# Patient Record
Sex: FEMALE | Race: WHITE
[De-identification: names, ages, dates, MRNs, and addresses within clinical notes are randomized per-mention and may not be internally consistent; named-entity substitution may affect disease eponyms.]

---

## 2019-03-24 ENCOUNTER — HOSPITAL ENCOUNTER (EMERGENCY)
Dept: HOSPITAL 56 - MW.ED | Age: 24
Discharge: HOME | End: 2019-03-24
Payer: COMMERCIAL

## 2019-03-24 VITALS — DIASTOLIC BLOOD PRESSURE: 62 MMHG | SYSTOLIC BLOOD PRESSURE: 109 MMHG

## 2019-03-24 DIAGNOSIS — Z91.018: ICD-10-CM

## 2019-03-24 DIAGNOSIS — O21.9: Primary | ICD-10-CM

## 2019-03-24 DIAGNOSIS — Z88.8: ICD-10-CM

## 2019-03-24 DIAGNOSIS — Z3A.17: ICD-10-CM

## 2019-03-24 DIAGNOSIS — F32.9: ICD-10-CM

## 2019-03-24 DIAGNOSIS — Z79.899: ICD-10-CM

## 2019-03-24 DIAGNOSIS — O99.342: ICD-10-CM

## 2019-03-24 DIAGNOSIS — F41.9: ICD-10-CM

## 2019-03-24 DIAGNOSIS — Z88.2: ICD-10-CM

## 2019-03-24 LAB
CHLORIDE SERPL-SCNC: 104 MMOL/L (ref 98–107)
SODIUM SERPL-SCNC: 139 MMOL/L (ref 136–145)

## 2019-03-24 PROCEDURE — 99284 EMERGENCY DEPT VISIT MOD MDM: CPT

## 2019-03-24 PROCEDURE — 96374 THER/PROPH/DIAG INJ IV PUSH: CPT

## 2019-03-24 PROCEDURE — 85025 COMPLETE CBC W/AUTO DIFF WBC: CPT

## 2019-03-24 PROCEDURE — 81003 URINALYSIS AUTO W/O SCOPE: CPT

## 2019-03-24 PROCEDURE — 36415 COLL VENOUS BLD VENIPUNCTURE: CPT

## 2019-03-24 PROCEDURE — 80053 COMPREHEN METABOLIC PANEL: CPT

## 2019-03-24 PROCEDURE — 96361 HYDRATE IV INFUSION ADD-ON: CPT

## 2019-03-24 PROCEDURE — 76815 OB US LIMITED FETUS(S): CPT

## 2019-03-24 NOTE — EDM.PDOC
ED HPI GENERAL MEDICAL PROBLEM





- General


Chief Complaint: Gastrointestinal Problem


Stated Complaint: PREG. BELLY PAIN


Time Seen by Provider: 19 10:47


Source of Information: Reports: Patient


History Limitations: Reports: No Limitations





- History of Present Illness


INITIAL COMMENTS - FREE TEXT/NARRATIVE: 





HISTORY AND PHYSICAL:





History of present illness:


Patient is a 23-year-old female here with complaint of nausea and vomiting. She 

is 17 weeks gestation, . She states she woke up this morning dry heaving 

then started vomiting bile. She has had nausea and vomiting throughout her 

whole pregnancy. She took some zofran but states she threw this up. She tried 

eating but was unable to keep anything down. She is complaining of a headache 

and states she is having left sided abdominal pain. She states it feels like a 

pulled muscle as she has had a pulled muscle with her previous pregnancy and it 

feels similar. She denies fevers, chills, diarrhea, cough, congestion, vaginal 

bleeding or discharge. 





Review of systems: 


As per history of present illness and below otherwise all systems reviewed and 

negative.





Past medical history: 


As per history of present illness and as reviewed below otherwise 

noncontributory.





Surgical history: 


As per history of present illness and as reviewed below otherwise 

noncontributory.





Social history: 


No reported history of drug or alcohol abuse.





Family history: 


As per history of present illness and as reviewed below otherwise 

noncontributory.





Physical exam:


General: Patient sitting comfortably in no acute distress and nontoxic appearing


HEENT: Atraumatic, normocephalic, pupils reactive, negative for conjunctival 

pallor or scleral icterus, mucous membranes moist, throat clear, neck supple, 

nontender, trachea midline. No meningeal signs. 


Lungs: Clear to auscultation, breath sounds equal bilaterally, chest nontender.


Heart: S1S2, regular, negative for clicks, rubs, or overt murmur.


Abdomen: Mild left upper and left lower quadrant tenderness to palpation. Soft, 

nondistended. Negative for masses or hepatosplenomegaly. Negative for 

costovertebral tenderness. No rigidity, rebound, guarding.


Pelvis: Stable nontender.


Genitourinary: Deferred.


Rectal: Deferred.


Extremities: Atraumatic, negative for cords or calf pain. Neurovascular 

unremarkable.


Neuro: Awake, alert, oriented. Cranial nerves II through XII unremarkable. 

Cerebellum unremarkable. Motor and sensory unremarkable throughout. Exam 

nonfocal.





Notes: 





Diagnostics:


CBC, CMP, UA 





Therapeutics:


1L Normal Saline IV 


4mg Zofran IV 





Prescriptions:


None 





Impression: 


Vomiting in pregnancy 





Plan:


1. Drink plenty of small sips of fluids throughout the day and bland food as 

tolerated, zofran as needed for nausea/vomiting.


2. Follow up with OB/gyn


3. Return to ED as needed as discussed 





Definitive disposition and diagnosis as appropriate pending reevaluation and 

review of above.





  ** abdomen


Pain Score (Numeric/FACES): 5





- Related Data


 Allergies











Allergy/AdvReac Type Severity Reaction Status Date / Time


 


budesonide Allergy  Numbness Verified 18 22:50


 


garlic Allergy  Shortness Verified 18 22:50





   of Breath  


 


NSAIDS (Non-Steroidal Allergy  Nausea Verified 19 10:52





Anti-Inflamma     


 


prednisone Allergy  Rash Verified 19 10:46


 


Sulfa (Sulfonamide Allergy  Rash Verified 18 22:50





Antibiotics)     


 


pine trees Allergy  Hives Uncoded 18 22:50











Home Meds: 


 Home Meds





buPROPion [Wellbutrin] 300 mg PO DAILY 18 [History]


Ondansetron [Zofran ODT] 4 mg PO Q6H PRN 19 [History]











Past Medical History





- Past Health History


Medical/Surgical History: Denies Medical/Surgical History


HEENT History: Reports: None


Cardiovascular History: Reports: None


Respiratory History: Reports: Asthma


Gastrointestinal History: Reports: Other (See Below)


Other Gastrointestinal History: chron's


Genitourinary History: Reports: None


OB/GYN History: Reports: Pregnancy


Musculoskeletal History: Reports: Back Pain, Chronic


Neurological History: Reports: None


Psychiatric History: Reports: Anxiety, Depression, OCD


Endocrine/Metabolic History: Reports: None


Hematologic History: Reports: None


Oncologic (Cancer) History: Reports: None


Dermatologic History: Reports: None





- Infectious Disease History


Infectious Disease History: Reports: None





- Past Surgical History


GI Surgical History: Reports: Colonoscopy, EGD





Social & Family History





- Family History


Family Medical History: Noncontributory


Other Dermatologic Family History: ovarian CA





- Caffeine Use


Caffeine Use: Reports: None





ED ROS GENERAL





- Review of Systems


Review Of Systems: ROS reveals no pertinent complaints other than HPI.





ED EXAM PREGNANCY





- Physical Exam


Exam: See Below (see dictation)





Course





- Vital Signs


Last Recorded V/S: 


 Last Vital Signs











Temp  98.6 F   19 10:47


 


Pulse  102 H  19 10:47


 


Resp  20   19 10:47


 


BP  126/72   19 10:47


 


Pulse Ox  100   19 10:47














- Orders/Labs/Meds


Orders: 


 Active Orders 24 hr











 Category Date Time Status


 


 Sodium Chloride 0.9% [Saline Flush] Med  19 10:51 Active





 10 ml FLUSH ASDIRECTED PRN   


 


 Sodium Chloride 0.9% [Saline Flush] Med  19 10:51 Active





 2.5 ml FLUSH ASDIRECTED PRN   


 


 Saline Lock Insert [OM.PC] Stat Oth  19 10:51 Ordered








 Medication Orders





Sodium Chloride (Saline Flush)  10 ml FLUSH ASDIRECTED PRN


   PRN Reason: Keep Vein Open


Sodium Chloride (Saline Flush)  2.5 ml FLUSH ASDIRECTED PRN


   PRN Reason: Keep Vein Open








Labs: 


 Laboratory Tests











  19 Range/Units





  11:00 11:00 11:45 


 


WBC  13.04 H    (4.0-11.0)  K/uL


 


RBC  4.17 L    (4.30-5.90)  M/uL


 


Hgb  13.4    (12.0-16.0)  g/dL


 


Hct  38.4    (36.0-46.0)  %


 


MCV  92.1    (80.0-98.0)  fL


 


MCH  32.1 H    (27.0-32.0)  pg


 


MCHC  34.9    (31.0-37.0)  g/dL


 


RDW Std Deviation  43.6    (28.0-62.0)  fl


 


RDW Coeff of Fatoumata  13    (11.0-15.0)  %


 


Plt Count  307    (150-400)  K/uL


 


MPV  11.20    (7.40-12.00)  fL


 


Neut % (Auto)  80.6 H    (48.0-80.0)  %


 


Lymph % (Auto)  14.0 L    (16.0-40.0)  %


 


Mono % (Auto)  4.8    (0.0-15.0)  %


 


Eos % (Auto)  0.4    (0.0-7.0)  %


 


Baso % (Auto)  0.2    (0.0-1.5)  %


 


Neut # (Auto)  10.5 H    (1.4-5.7)  K/uL


 


Lymph # (Auto)  1.8    (0.6-2.4)  K/uL


 


Mono # (Auto)  0.6    (0.0-0.8)  K/uL


 


Eos # (Auto)  0.1    (0.0-0.7)  K/uL


 


Baso # (Auto)  0.0    (0.0-0.1)  K/uL


 


Nucleated RBC %  0.0    /100WBC


 


Nucleated RBCs #  0    K/uL


 


Sodium   139   (136-145)  mmol/L


 


Potassium   3.3 L   (3.5-5.1)  mmol/L


 


Chloride   104   ()  mmol/L


 


Carbon Dioxide   24.8   (21.0-32.0)  mmol/L


 


BUN   4 L   (7.0-18.0)  mg/dL


 


Creatinine   0.6   (0.6-1.0)  mg/dL


 


Est Cr Clr Drug Dosing   120.01   mL/min


 


Estimated GFR (MDRD)   > 60.0   ml/min


 


Glucose   106   ()  mg/dL


 


Calcium   8.6   (8.5-10.1)  mg/dL


 


Total Bilirubin   0.4   (0.2-1.0)  mg/dL


 


AST   13 L   (15-37)  IU/L


 


ALT   15   (14-63)  IU/L


 


Alkaline Phosphatase   80   ()  U/L


 


Total Protein   7.1   (6.4-8.2)  g/dL


 


Albumin   3.1 L   (3.4-5.0)  g/dL


 


Globulin   4.0   (2.6-4.0)  g/dL


 


Albumin/Globulin Ratio   0.8 L   (0.9-1.6)  


 


Urine Color    YELLOW  


 


Urine Appearance    CLEAR  


 


Urine pH    7.0  (5.0-8.0)  


 


Ur Specific Gravity    1.010  (1.001-1.035)  


 


Urine Protein    NEGATIVE  (NEGATIVE)  mg/dL


 


Urine Glucose (UA)    NEGATIVE  (NEGATIVE)  mg/dL


 


Urine Ketones    NEGATIVE  (NEGATIVE)  mg/dL


 


Urine Occult Blood    NEGATIVE  (NEGATIVE)  


 


Urine Nitrite    NEGATIVE  (NEGATIVE)  


 


Urine Bilirubin    NEGATIVE  (NEGATIVE)  


 


Urine Urobilinogen    0.2  (<2.0)  EU/dL


 


Ur Leukocyte Esterase    NEGATIVE  (NEGATIVE)  











Meds: 


Medications











Generic Name Dose Route Start Last Admin





  Trade Name Freq  PRN Reason Stop Dose Admin


 


Sodium Chloride  10 ml  19 10:51  





  Saline Flush  FLUSH   





  ASDIRECTED PRN   





  Keep Vein Open   





     





     





     


 


Sodium Chloride  2.5 ml  19 10:51  





  Saline Flush  FLUSH   





  ASDIRECTED PRN   





  Keep Vein Open   





     





     





     














Discontinued Medications














Generic Name Dose Route Start Last Admin





  Trade Name Aleksandrq  PRN Reason Stop Dose Admin


 


Acetaminophen  1,000 mg  19 12:08  





  Tylenol Extra Strength  PO  19 12:09  





  ONETIME ONE   





     





     





     





     


 


Sodium Chloride  1,000 mls @ 999 mls/hr  19 10:51  19 11:03





  Normal Saline  IV  19 11:51  999 mls/hr





  STAT ONE   Administration





     





     





     





     


 


Ondansetron HCl  4 mg  19 10:51  19 11:03





  Zofran  IVPUSH  19 10:52  4 mg





  ONETIME ONE   Administration





     





     





     





     














Departure





- Departure


Time of Disposition: 12:19


Disposition: Home, Self-Care 01


Condition: Good


Clinical Impression: 


 Vomiting affecting pregnancy








- Discharge Information


Referrals: 


PCP,Unknown [Primary Care Provider] - 


Forms:  ED Department Discharge


Additional Instructions: 


The following information is given to patients seen in the emergency department 

who are being discharged to home. This information is to outline your options 

for follow-up care. We provide all patients seen in our emergency department 

with a follow-up referral.





The need for follow-up, as well as the timing and circumstances, are variable 

depending upon the specifics of your emergency department visit.





If you don't have a primary care physician on staff, we will provide you with a 

referral. We always advise you to contact your personal physician following an 

emergency department visit to inform them of the circumstance of the visit and 

for follow-up with them and/or the need for any referrals to a consulting 

specialist.





The emergency department will also refer you to a specialist when appropriate. 

This referral assures that you have the opportunity for follow-up care with a 

specialist. All of these measure are taken in an effort to provide you with 

optimal care, which includes your follow-up.





Under all circumstances we always encourage you to contact your private 

physician who remains a resource for coordinating your care. When calling for 

follow-up care, please make the office aware that this follow-up is from your 

recent emergency room visit. If for any reason you are refused follow-up, 

please contact the [facility] Emergency 

Department at [phone] and asked to speak to the emergency department 

charge nurse.





[facility]


[address] [place] [id number]


Phone: [phone]


Fax: [phone]





1. Give plenty of fluids and alternate tylenol and motrin as discussed. 


2. Follow up with pediatrician


3. Return to ED as needed as discussed 




















- My Orders


Last 24 Hours: 


My Active Orders





19 10:51


Sodium Chloride 0.9% [Saline Flush]   10 ml FLUSH ASDIRECTED PRN 


Sodium Chloride 0.9% [Saline Flush]   2.5 ml FLUSH ASDIRECTED PRN 


Saline Lock Insert [OM.PC] Stat 














- Assessment/Plan


Last 24 Hours: 


My Active Orders





19 10:51


Sodium Chloride 0.9% [Saline Flush]   10 ml FLUSH ASDIRECTED PRN 


Sodium Chloride 0.9% [Saline Flush]   2.5 ml FLUSH ASDIRECTED PRN 


Saline Lock Insert [OM.PC] Stat

## 2019-03-24 NOTE — US
CLINICAL HISTORY:



Lower abdominal pain bleeding 



TECHNIQUE:



Real time gray scale imaging of the fetus was performed as well as color 

Doppler and spectral Doppler analysis of the umbilical artery. 



FINDINGS:



Sonographic imaging demonstrates a single living intrauterine gestation.  

Fetus demonstrates a regular cardiac rate of  136 beats per minute.  Fetus 

has a transverse orientation .  The placenta lies anterior  without 

evidence of placenta previa.  Amniotic fluid volume appears normal. The 

composite ultrasound gestational age is calculated at 17 weeks 2 days . The 

estimated fetal weight is 190 grams which lies at the  82nd percentile. 

Cervical length measures 3.4 cm. 



Fetal biometry: 



BPD 3.7 cm 17 weeks 2 days 



HC: 13.5 cm 17 weeks 0 days 



HC: 11.7 cm 17 weeks 4 days 



FL: 2.4 cm/17 weeks 2 days 



IMPRESSION:



 1. Single live intrauterine gestation with composite gestational age of 17 

weeks 2 days with KAVITA of 08/30/2019. No perigestational hemorrhage.



Dictated by Nilsa Wray MD @ Mar 24 2019 12:10PM



Signed by Dr. Nilsa Wray @ Mar 24 2019 12:13PM

## 2019-12-15 ENCOUNTER — HOSPITAL ENCOUNTER (EMERGENCY)
Dept: HOSPITAL 56 - MW.ED | Age: 24
Discharge: HOME | End: 2019-12-15
Payer: COMMERCIAL

## 2019-12-15 VITALS — HEART RATE: 90 BPM | DIASTOLIC BLOOD PRESSURE: 91 MMHG | SYSTOLIC BLOOD PRESSURE: 123 MMHG

## 2019-12-15 DIAGNOSIS — Z88.8: ICD-10-CM

## 2019-12-15 DIAGNOSIS — Z91.018: ICD-10-CM

## 2019-12-15 DIAGNOSIS — Z88.2: ICD-10-CM

## 2019-12-15 DIAGNOSIS — F41.9: ICD-10-CM

## 2019-12-15 DIAGNOSIS — Z79.899: ICD-10-CM

## 2019-12-15 DIAGNOSIS — F32.9: ICD-10-CM

## 2019-12-15 DIAGNOSIS — K50.919: Primary | ICD-10-CM

## 2019-12-15 LAB
BUN SERPL-MCNC: 8 MG/DL (ref 7–18)
CHLORIDE SERPL-SCNC: 102 MMOL/L (ref 98–107)
CO2 SERPL-SCNC: 28.4 MMOL/L (ref 21–32)
GLUCOSE SERPL-MCNC: 78 MG/DL (ref 74–106)
POTASSIUM SERPL-SCNC: 3.8 MMOL/L (ref 3.5–5.1)
SODIUM SERPL-SCNC: 140 MMOL/L (ref 136–145)

## 2019-12-15 PROCEDURE — 99284 EMERGENCY DEPT VISIT MOD MDM: CPT

## 2019-12-15 PROCEDURE — 81025 URINE PREGNANCY TEST: CPT

## 2019-12-15 PROCEDURE — 36415 COLL VENOUS BLD VENIPUNCTURE: CPT

## 2019-12-15 PROCEDURE — 85025 COMPLETE CBC W/AUTO DIFF WBC: CPT

## 2019-12-15 PROCEDURE — 96361 HYDRATE IV INFUSION ADD-ON: CPT

## 2019-12-15 PROCEDURE — 80053 COMPREHEN METABOLIC PANEL: CPT

## 2019-12-15 PROCEDURE — 83690 ASSAY OF LIPASE: CPT

## 2019-12-15 PROCEDURE — 74177 CT ABD & PELVIS W/CONTRAST: CPT

## 2019-12-15 PROCEDURE — 81003 URINALYSIS AUTO W/O SCOPE: CPT

## 2019-12-15 PROCEDURE — 96374 THER/PROPH/DIAG INJ IV PUSH: CPT

## 2019-12-15 RX ADMIN — KETOROLAC TROMETHAMINE ONE: 30 INJECTION, SOLUTION INTRAMUSCULAR at 18:05

## 2019-12-15 RX ADMIN — KETOROLAC TROMETHAMINE ONE MG: 30 INJECTION, SOLUTION INTRAMUSCULAR at 17:59

## 2019-12-15 NOTE — EDM.PDOC
<Go Baig - Last Filed: 12/15/19 18:34>





ED HPI GENERAL MEDICAL PROBLEM





- General


Chief Complaint: Abdominal Pain


Stated Complaint: ABD PAIN


Time Seen by Provider: 12/15/19 16:42


Source of Information: Reports: Patient





- History of Present Illness


INITIAL COMMENTS - FREE TEXT/NARRATIVE: 





HISTORY AND PHYSICAL:


History of present illness:


[patient has had abdominal pain in epigastrium off and on over 2 weeks with 

eating, today on exam she has pain in RLQ





+n/v/ no f/c/s/cp/sob/had/palp





h/o Crohn's Dz


]


Review of systems: 


As per history of present illness and below otherwise all systems reviewed and 

negative.


Past medical history: 


As per history of present illness and as reviewed below otherwise 

noncontributory.


Surgical history: 


As per history of present illness and as reviewed below otherwise 

noncontributory.


Social history: 


No reported history of drug or alcohol abuse.


Family history: 


As per history of present illness and as reviewed below otherwise 

noncontributory.





Physical exam:


HEENT: Atraumatic, normocephalic, pupils reactive, negative for conjunctival 

pallor or scleral icterus, mucous membranes moist, throat clear, neck supple, 

nontender, trachea midline.


Lungs: Clear to auscultation, breath sounds equal bilaterally, chest nontender.


Heart: S1S2, regular, negative for clicks, rubs, or JVD.


Abdomen: Soft, nondistended, non focal tenderness on deep palpation Negative 

for masses or hepatosplenomegaly. Negative for costovertebral tenderness.


Pelvis: Stable nontender.


Genitourinary: Deferred.


Rectal: Deferred.


Extremities: Atraumatic, negative for cords or calf pain. Neurovascular 

unremarkable.


Neuro: Awake, alert, oriented. Cranial nerves II through XII unremarkable. 

Cerebellum unremarkable. Motor and sensory unremarkable throughout. Exam 

nonfocal.





Diagnostics:


[cbc, cmp ,ua hcg


abd/pelv w


]


Therapeutics:


[ns


toradol -not provided due to allergy list


zofran





signed out to dr. robledo to follow cmp and ct abd/pelvis and redirect/

disposition


]


Impression: 


[abd pain


]h/o crohn'z Dz





Definitive disposition and diagnosis as appropriate pending reevaluation and 

review of above.


  ** Abdominal


Pain Score (Numeric/FACES): 5





- Related Data


 Allergies











Allergy/AdvReac Type Severity Reaction Status Date / Time


 


budesonide Allergy  Numbness Verified 12/15/19 16:55


 


garlic Allergy  Shortness Verified 12/15/19 16:55





   of Breath  


 


NSAIDS (Non-Steroidal Allergy  Nausea Verified 12/15/19 16:55





Anti-Inflamma     


 


prednisone Allergy  Rash Verified 12/15/19 16:55


 


Sulfa (Sulfonamide Allergy  Rash Verified 12/15/19 16:55





Antibiotics)     


 


pine trees Allergy  Hives Uncoded 12/15/19 16:55











Home Meds: 


 Home Meds





buPROPion [Wellbutrin] 300 mg PO DAILY 01/23/18 [History]


Ondansetron [Zofran ODT] 4 mg PO Q6H PRN 03/24/19 [History]


ClonazePAM [KlonoPIN] 0.5 mg PO ASDIRECTED 12/15/19 [History]


FLUoxetine [PROzac] 10 mg PO DAILY 12/15/19 [History]











Past Medical History





- Past Health History


Medical/Surgical History: Denies Medical/Surgical History


HEENT History: Reports: None


Cardiovascular History: Reports: None


Respiratory History: Reports: Asthma


Gastrointestinal History: Reports: Other (See Below)


Other Gastrointestinal History: chron's


Genitourinary History: Reports: None


OB/GYN History: Reports: Pregnancy


Musculoskeletal History: Reports: Back Pain, Chronic


Neurological History: Reports: None


Psychiatric History: Reports: Anxiety, Depression, OCD


Endocrine/Metabolic History: Reports: None


Hematologic History: Reports: None


Oncologic (Cancer) History: Reports: None


Dermatologic History: Reports: None





- Infectious Disease History


Infectious Disease History: Reports: None





- Past Surgical History


GI Surgical History: Reports: Colonoscopy, EGD





Social & Family History





- Family History


Family Medical History: Noncontributory


Other Dermatologic Family History: ovarian CA





- Caffeine Use


Caffeine Use: Reports: None





Course





- Vital Signs


Last Recorded V/S: 


 Last Vital Signs











Temp  35.7 C   12/15/19 16:57


 


Pulse  98   12/15/19 19:18


 


Resp  18   12/15/19 19:18


 


BP  139/97 H  12/15/19 19:18


 


Pulse Ox  100   12/15/19 19:18














- Orders/Labs/Meds


Orders: 


 Active Orders 24 hr











 Category Date Time Status


 


 Notify Provider Consults [RC] ASDIRECTED Care  12/15/19 19:31 Active


 


 Consult to Physician [CONS] Stat Cons  12/15/19 19:31 Active











Labs: 


 Laboratory Tests











  12/15/19 12/15/19 12/15/19 Range/Units





  17:34 17:34 17:43 


 


WBC    11.40 H  (4.0-11.0)  K/uL


 


RBC    4.54  (4.30-5.90)  M/uL


 


Hgb    13.0  (12.0-16.0)  g/dL


 


Hct    39.7  (36.0-46.0)  %


 


MCV    87.4  (80.0-98.0)  fL


 


MCH    28.6  (27.0-32.0)  pg


 


MCHC    32.7  (31.0-37.0)  g/dL


 


RDW Std Deviation    46.0  (28.0-62.0)  fl


 


RDW Coeff of Fatoumata    14  (11.0-15.0)  %


 


Plt Count    408 H  (150-400)  K/uL


 


MPV    11.80  (7.40-12.00)  fL


 


Neut % (Auto)    67.9  (48.0-80.0)  %


 


Lymph % (Auto)    17.8  (16.0-40.0)  %


 


Mono % (Auto)    12.5  (0.0-15.0)  %


 


Eos % (Auto)    1.2  (0.0-7.0)  %


 


Baso % (Auto)    0.6  (0.0-1.5)  %


 


Neut # (Auto)    7.7 H  (1.4-5.7)  K/uL


 


Lymph # (Auto)    2.0  (0.6-2.4)  K/uL


 


Mono # (Auto)    1.4 H  (0.0-0.8)  K/uL


 


Eos # (Auto)    0.1  (0.0-0.7)  K/uL


 


Baso # (Auto)    0.1  (0.0-0.1)  K/uL


 


Nucleated RBC %    0.0  /100WBC


 


Nucleated RBCs #    0  K/uL


 


Sodium     (136-145)  mmol/L


 


Potassium     (3.5-5.1)  mmol/L


 


Chloride     ()  mmol/L


 


Carbon Dioxide     (21.0-32.0)  mmol/L


 


BUN     (7.0-18.0)  mg/dL


 


Creatinine     (0.6-1.0)  mg/dL


 


Est Cr Clr Drug Dosing     mL/min


 


Estimated GFR (MDRD)     ml/min


 


Glucose     ()  mg/dL


 


Calcium     (8.5-10.1)  mg/dL


 


Total Bilirubin     (0.2-1.0)  mg/dL


 


AST     (15-37)  IU/L


 


ALT     (14-63)  IU/L


 


Alkaline Phosphatase     ()  U/L


 


Total Protein     (6.4-8.2)  g/dL


 


Albumin     (3.4-5.0)  g/dL


 


Globulin     (2.6-4.0)  g/dL


 


Albumin/Globulin Ratio     (0.9-1.6)  


 


Lipase     ()  U/L


 


Urine Color  YELLOW    


 


Urine Appearance  CLEAR    


 


Urine pH  7.0    (5.0-8.0)  


 


Ur Specific Gravity  1.015    (1.001-1.035)  


 


Urine Protein  NEGATIVE    (NEGATIVE)  mg/dL


 


Urine Glucose (UA)  NEGATIVE    (NEGATIVE)  mg/dL


 


Urine Ketones  NEGATIVE    (NEGATIVE)  mg/dL


 


Urine Occult Blood  NEGATIVE    (NEGATIVE)  


 


Urine Nitrite  NEGATIVE    (NEGATIVE)  


 


Urine Bilirubin  NEGATIVE    (NEGATIVE)  


 


Urine Urobilinogen  0.2    (<2.0)  EU/dL


 


Ur Leukocyte Esterase  NEGATIVE    (NEGATIVE)  


 


Urine HCG, Qual   NEGATIVE   (NEGATIVE)  














  12/15/19 12/15/19 Range/Units





  17:43 17:43 


 


WBC    (4.0-11.0)  K/uL


 


RBC    (4.30-5.90)  M/uL


 


Hgb    (12.0-16.0)  g/dL


 


Hct    (36.0-46.0)  %


 


MCV    (80.0-98.0)  fL


 


MCH    (27.0-32.0)  pg


 


MCHC    (31.0-37.0)  g/dL


 


RDW Std Deviation    (28.0-62.0)  fl


 


RDW Coeff of Fatoumata    (11.0-15.0)  %


 


Plt Count    (150-400)  K/uL


 


MPV    (7.40-12.00)  fL


 


Neut % (Auto)    (48.0-80.0)  %


 


Lymph % (Auto)    (16.0-40.0)  %


 


Mono % (Auto)    (0.0-15.0)  %


 


Eos % (Auto)    (0.0-7.0)  %


 


Baso % (Auto)    (0.0-1.5)  %


 


Neut # (Auto)    (1.4-5.7)  K/uL


 


Lymph # (Auto)    (0.6-2.4)  K/uL


 


Mono # (Auto)    (0.0-0.8)  K/uL


 


Eos # (Auto)    (0.0-0.7)  K/uL


 


Baso # (Auto)    (0.0-0.1)  K/uL


 


Nucleated RBC %    /100WBC


 


Nucleated RBCs #    K/uL


 


Sodium  140   (136-145)  mmol/L


 


Potassium  3.8   (3.5-5.1)  mmol/L


 


Chloride  102   ()  mmol/L


 


Carbon Dioxide  28.4   (21.0-32.0)  mmol/L


 


BUN  8   (7.0-18.0)  mg/dL


 


Creatinine  0.9   (0.6-1.0)  mg/dL


 


Est Cr Clr Drug Dosing  80.04   mL/min


 


Estimated GFR (MDRD)  > 60.0   ml/min


 


Glucose  78   ()  mg/dL


 


Calcium  8.6   (8.5-10.1)  mg/dL


 


Total Bilirubin  0.3   (0.2-1.0)  mg/dL


 


AST  15   (15-37)  IU/L


 


ALT  24   (14-63)  IU/L


 


Alkaline Phosphatase  129 H   ()  U/L


 


Total Protein  7.3   (6.4-8.2)  g/dL


 


Albumin  3.3 L   (3.4-5.0)  g/dL


 


Globulin  4.0   (2.6-4.0)  g/dL


 


Albumin/Globulin Ratio  0.8 L   (0.9-1.6)  


 


Lipase   89  ()  U/L


 


Urine Color    


 


Urine Appearance    


 


Urine pH    (5.0-8.0)  


 


Ur Specific Gravity    (1.001-1.035)  


 


Urine Protein    (NEGATIVE)  mg/dL


 


Urine Glucose (UA)    (NEGATIVE)  mg/dL


 


Urine Ketones    (NEGATIVE)  mg/dL


 


Urine Occult Blood    (NEGATIVE)  


 


Urine Nitrite    (NEGATIVE)  


 


Urine Bilirubin    (NEGATIVE)  


 


Urine Urobilinogen    (<2.0)  EU/dL


 


Ur Leukocyte Esterase    (NEGATIVE)  


 


Urine HCG, Qual    (NEGATIVE)  











Meds: 


Medications














Discontinued Medications














Generic Name Dose Route Start Last Admin





  Trade Name Freq  PRN Reason Stop Dose Admin


 


Sodium Chloride  1,000 mls @ 999 mls/hr  12/15/19 16:35  12/15/19 17:36





  Normal Saline  IV  12/15/19 17:35  999 mls/hr





  STAT ONE   Administration





     





     





     





     


 


Iopamidol  100 ml  12/15/19 19:07  12/15/19 19:07





  Isovue-370 (76%)  IVPUSH  12/15/19 19:08  100 ml





  ONETIME STA   Administration





     





     





     





     


 


Ketorolac Tromethamine  30 mg  12/15/19 17:40  12/15/19 18:05





  Toradol  IVPUSH  12/15/19 17:41  Not Given





  ONETIME ONE   





     





     





     





     


 


Morphine Sulfate  2 mg  12/15/19 19:12  12/15/19 19:19





  Morphine  IVPUSH  12/15/19 19:13  2 mg





  ONETIME ONE   Administration





     





     





     





     


 


Ondansetron HCl  8 mg  12/15/19 17:41  12/15/19 17:57





  Zofran  IVPUSH  12/15/19 17:42  8 mg





  ONETIME ONE   Administration





     





     





     





     














Departure





- Departure


Disposition: Home, Self-Care 01


Clinical Impression: 


Exacerbation of Crohn's disease


Qualifiers:


 Digestive disease complication type: unspecified complication Qualified Code(s)

: K50.919 - Crohn's disease, unspecified, with unspecified complications








- Discharge Information


Referrals: 


Beth Portillo NP [Primary Care Provider] - 


Forms:  ED Department Discharge


Additional Instructions: 


The following information is given to patients seen in the emergency department 

who are being discharged to home. This information is to outline your options 

for follow-up care. We provide all patients seen in our emergency department 

with a follow-up referral.





The need for follow-up, as well as the timing and circumstances, are variable 

depending upon the specifics of your emergency department visit.





If you don't have a primary care physician on staff, we will provide you with a 

referral. We always advise you to contact your personal physician following an 

emergency department visit to inform them of the circumstance of the visit and 

for follow-up with them and/or the need for any referrals to a consulting 

specialist.





The emergency department will also refer you to a specialist when appropriate. 

This referral assures that you have the opportunity for followup care with a 

specialist. All of these measure are taken in an effort to provide you with 

optimal care, which includes your followup.





Under all circumstances we always encourage you to contact your private 

physician who remains a resource for coordinating  your care. When calling for 

followup care, please make the office aware that this follow-up is from your 

recent emergency room visit. If for any reason you are refused follow-up, 

please contact the [facility] emergency 

department at [phone] and ask to speak to the emergency department 

charge nurse.





[facility]


[address]


[place] [id number]


[phone]








Please contact [facility] tomorrow and get in for a follow-up appointment 

as you discussed with Dr. Ordaz. Push hydration and use her home medication 

and add to it the dicyclomine/dental you have been given from [employer]. 

Return to ER as needed and as discussed





Sepsis Event Note





- Focused Exam


Vital Signs: 


 Vital Signs











  Temp Pulse Resp BP Pulse Ox


 


 12/15/19 19:18   98  18  139/97 H  100


 


 12/15/19 18:02   95  18  135/85  98


 


 12/15/19 16:57  35.7 C  106 H  16  142/90 H  98











Date Exam was Performed: 12/15/19


Time Exam was Performed: 18:34





- My Orders


Last 24 Hours: 


My Active Orders





12/15/19 19:31


Notify Provider Consults [RC] ASDIRECTED 


Consult to Physician [CONS] Stat 














- Assessment/Plan


Last 24 Hours: 


My Active Orders





12/15/19 19:31


Notify Provider Consults [RC] ASDIRECTED 


Consult to Physician [CONS] Stat 














<Abril Robledo - Last Filed: 12/15/19 20:34>





ED HPI GENERAL MEDICAL PROBLEM





- History of Present Illness


INITIAL COMMENTS - FREE TEXT/NARRATIVE: 





This is Dr. Robledo dictating an addendum note as I assumed care of this case 

at 7 PM. The labs of been reviewed by Dr. Levin and May and the CT scan was 

endorsed to me to follow-up. The radiologist has contacted me about 

inflammation and thickening of the small bowel to the terminal ileum he does 

not see any signs of appendicitis and he thinks the appendix looks normal. He 

does see an area that looks like a sinus tract near the terminal ileum as well 

as a few gas bubbles in the right lower quadrant that he is not sure if they 

are contained or if there is a small microperforation. I reevaluated the 

patient myself and she does have tenderness in the epigastrium and the right 

lower quadrant but there is no rebound or guarding. She overall looks very 

comfortable. I did give her a small dose of morphine here in the ED and she 

tells me that she does not have a GI specialist but does follow at [facility] with the nurse practitioner. She says that she has not been on a 

biologic but she is on prednisone. She tells me that whenever she has a Crohn's 

flareup it is in the right lower quadrant so she is not surprised. I have 

described to her the CT scan findings and I told her that I have consulted Dr. Ordaz to come in and lay hands on her have a conversation with her and 

review the CT for disposition and further care and she is agreeable.





Dr. Ordaz is here reviewing the CAT scan and examining the patient at 8:05 

PM. We will discuss her disposition following his evaluation








Please see Dr. Ordaz's consultation but he has advised me that the patient 

will be going home and needs to follow-up at [facility] and get in with 

gastroenterology. He agrees with [place] for home and the patient is aware of 

this care plan.





Please add to impression above: Crohn's flareup





ED ROS GENERAL





- Review of Systems


Review Of Systems: Comprehensive ROS is negative, except as noted in HPI.





ED EXAM, GENERAL





- Physical Exam


Exam: See Below (see dictation)





Departure





- Departure


Time of Disposition: 20:34


Condition: Good





Sepsis Event Note





- Focused Exam


Date Exam was Performed: 12/15/19


Time Exam was Performed: 20:33

## 2019-12-15 NOTE — PCM.CONS
H&P History of Present Illness





- General


Date of Service: 12/15/19


Admit Problem/Dx: 





Abdominal pain.  4 year history of Crohn's disease.  Currently not seeing a 

gastroenterologist.


Source of Information: Patient


History Limitations: Reports: No Limitations





- History of Present Illness


Initial Comments - Free Text/Narative: 


Patient is a 24-year-old female with a 4 year history of Crohn's disease.  For 

the last 3-4 weeks, she's been having increasing abdominal discomfort.  She is 

finding more foods that she is intolerant of.  The pain became significant 

enough today that she presented to the emergency room and was evaluated by Dr. Baig and Dr. Carter.  CT scan of the abdomen was done suggesting a flareup 

of her Crohn's disease and surgical consultation was requested.





Duration of Symptoms: Reports: Week(s):, Chronic, Constant, Getting Worse


Location: Reports: Abdomen


Quality: Reports: Ache, Pressure, Same as Previous Episode


Severity: Moderate


Improves with: Reports: None, Medication


Context: Reports: Sick Contact


Associated Symptoms: Reports: Loss of Appetite.  Denies: Nausea/Vomiting


  ** Abdominal


Pain Score (Numeric/FACES): 5





- Related Data


Allergies/Adverse Reactions: 


 Allergies











Allergy/AdvReac Type Severity Reaction Status Date / Time


 


budesonide Allergy  Numbness Verified 12/15/19 16:55


 


garlic Allergy  Shortness Verified 12/15/19 16:55





   of Breath  


 


NSAIDS (Non-Steroidal Allergy  Nausea Verified 12/15/19 16:55





Anti-Inflamma     


 


prednisone Allergy  Rash Verified 12/15/19 16:55


 


Sulfa (Sulfonamide Allergy  Rash Verified 12/15/19 16:55





Antibiotics)     


 


pine trees Allergy  Hives Uncoded 12/15/19 16:55











Home Medications: 


 Home Meds





buPROPion [Wellbutrin] 300 mg PO DAILY 18 [History]


Ondansetron [Zofran ODT] 4 mg PO Q6H PRN 19 [History]


ClonazePAM [KlonoPIN] 0.5 mg PO ASDIRECTED 12/15/19 [History]


FLUoxetine [PROzac] 10 mg PO DAILY 12/15/19 [History]











Past Medical History





- Past Health History


Medical/Surgical History: Denies Medical/Surgical History


HEENT History: Reports: None


Cardiovascular History: Reports: None


Respiratory History: Reports: Asthma


Gastrointestinal History: Reports: Other (See Below)


Other Gastrointestinal History: Crohn's Disease


Genitourinary History: Reports: None, Renal Calculus


OB/GYN History: Reports: Pregnancy


: 2


Para: 2


LMP (Approximate): Other (See Below) (Periods are irregular)


Musculoskeletal History: Reports: Back Pain, Chronic


Neurological History: Reports: None


Psychiatric History: Reports: Anxiety, Depression, OCD


Endocrine/Metabolic History: Reports: None


Hematologic History: Reports: None


Oncologic (Cancer) History: Reports: None


Dermatologic History: Reports: None





- Infectious Disease History


Infectious Disease History: Reports: None





- Past Surgical History


GI Surgical History: Reports: Colonoscopy, EGD





Social & Family History





- Family History


Family Medical History: Noncontributory


Other GI Family History: Paternal Grandmother  from Crohn's disease.


Oncologic: Reports: Ovarian (Maternal Grandmother)





- Tobacco Use


Smoking Status *Q: Unknown Ever Smoked


Second Hand Smoke Exposure: No





- Caffeine Use


Caffeine Use: Reports: None





- Recreational Drug Use


Recreational Drug Use: No





H&P Review of Systems





- Review of Systems:


Review Of Systems: See Below


General: Reports: Malaise, Decreased Appetite.  Denies: Fever, Chills, Weakness

, Fatigue


HEENT: Reports: No Symptoms


Pulmonary: Denies: Shortness of Breath, Wheezing, Pleuritic Chest Pain


Cardiovascular: Denies: Chest Pain


Gastrointestinal: Reports: Abdominal Pain, Anorexia, Decreased Appetite, 

Flatus.  Denies: Black Stool, Bloody Stool, Constipation, Diarrhea, Distension, 

Hematemesis, Hematochezia, Melena, Nausea, Vomiting


Genitourinary: Denies: Dysuria, Frequency, Burning, Pain, Urgency


Musculoskeletal: Reports: No Symptoms


Skin: Reports: No Symptoms


Psychiatric: Reports: No Symptoms


Neurological: Reports: No Symptoms


Hematologic/Lymphatic: Reports: No Symptoms


Immunologic: Reports: No Symptoms





Exam





- Exam


Exam: See Below





- Vital Signs


Vital Signs: 


 Last Vital Signs











Temp  96.3 F   12/15/19 16:57


 


Pulse  98   12/15/19 19:18


 


Resp  18   12/15/19 19:18


 


BP  139/97 H  12/15/19 19:18


 


Pulse Ox  100   12/15/19 19:18











Weight: 114 lb





- Exam


Quality Assessment: No: Supplemental Oxygen, Central Line/PICC


General: Alert, Oriented, Cooperative, Mild Distress


HEENT: Conjunctiva Clear, EACs Clear, Pupils Equal, Pupils Reactive.  No: 

Abnormal Pupils, Scleral Icterus


Neck: Supple, Trachea Midline, +2 Carotid Pulse wo Bruit


Lungs: Clear to Auscultation, Normal Respiratory Effort.  No: Rales, Rhonchi, 

Wheezing


Cardiovascular: Regular Rate, Regular Rhythm.  No: Tachycardia


GI/Abdominal Exam: Normal Bowel Sounds, Soft, Non-Tender.  No: Guarding, Rigid, 

Rebound


 (Female) Exam: Deferred


Rectal (Female) Exam: Deferred


Back Exam: Normal Inspection


Extremities: Normal Inspection, Normal Range of Motion


Peripheral Pulses: 4+: Posterior Tibial (L), Posterior Tibial (R), Dorsalis 

Pedis (L), Dorsalis Pedis (R)


Skin: Warm, Dry, Intact


Neurological: Cranial Nerves Intact, Reflexes Equal Bilateral


Neuro Extensive - Mental Status: Alert, Oriented x3, Normal Mood/Affect


Psychiatric: Alert, Normal Affect, Normal Mood.  No: Anxious





- Patient Data


Lab Results Last 24 hrs: 


 Laboratory Results - last 24 hr











  12/15/19 12/15/19 12/15/19 Range/Units





  17:34 17:34 17:43 


 


WBC    11.40 H  (4.0-11.0)  K/uL


 


RBC    4.54  (4.30-5.90)  M/uL


 


Hgb    13.0  (12.0-16.0)  g/dL


 


Hct    39.7  (36.0-46.0)  %


 


MCV    87.4  (80.0-98.0)  fL


 


MCH    28.6  (27.0-32.0)  pg


 


MCHC    32.7  (31.0-37.0)  g/dL


 


RDW Std Deviation    46.0  (28.0-62.0)  fl


 


RDW Coeff of Fatoumata    14  (11.0-15.0)  %


 


Plt Count    408 H  (150-400)  K/uL


 


MPV    11.80  (7.40-12.00)  fL


 


Neut % (Auto)    67.9  (48.0-80.0)  %


 


Lymph % (Auto)    17.8  (16.0-40.0)  %


 


Mono % (Auto)    12.5  (0.0-15.0)  %


 


Eos % (Auto)    1.2  (0.0-7.0)  %


 


Baso % (Auto)    0.6  (0.0-1.5)  %


 


Neut # (Auto)    7.7 H  (1.4-5.7)  K/uL


 


Lymph # (Auto)    2.0  (0.6-2.4)  K/uL


 


Mono # (Auto)    1.4 H  (0.0-0.8)  K/uL


 


Eos # (Auto)    0.1  (0.0-0.7)  K/uL


 


Baso # (Auto)    0.1  (0.0-0.1)  K/uL


 


Nucleated RBC %    0.0  /100WBC


 


Nucleated RBCs #    0  K/uL


 


Sodium     (136-145)  mmol/L


 


Potassium     (3.5-5.1)  mmol/L


 


Chloride     ()  mmol/L


 


Carbon Dioxide     (21.0-32.0)  mmol/L


 


BUN     (7.0-18.0)  mg/dL


 


Creatinine     (0.6-1.0)  mg/dL


 


Est Cr Clr Drug Dosing     mL/min


 


Estimated GFR (MDRD)     ml/min


 


Glucose     ()  mg/dL


 


Calcium     (8.5-10.1)  mg/dL


 


Total Bilirubin     (0.2-1.0)  mg/dL


 


AST     (15-37)  IU/L


 


ALT     (14-63)  IU/L


 


Alkaline Phosphatase     ()  U/L


 


Total Protein     (6.4-8.2)  g/dL


 


Albumin     (3.4-5.0)  g/dL


 


Globulin     (2.6-4.0)  g/dL


 


Albumin/Globulin Ratio     (0.9-1.6)  


 


Lipase     ()  U/L


 


Urine Color  YELLOW    


 


Urine Appearance  CLEAR    


 


Urine pH  7.0    (5.0-8.0)  


 


Ur Specific Gravity  1.015    (1.001-1.035)  


 


Urine Protein  NEGATIVE    (NEGATIVE)  mg/dL


 


Urine Glucose (UA)  NEGATIVE    (NEGATIVE)  mg/dL


 


Urine Ketones  NEGATIVE    (NEGATIVE)  mg/dL


 


Urine Occult Blood  NEGATIVE    (NEGATIVE)  


 


Urine Nitrite  NEGATIVE    (NEGATIVE)  


 


Urine Bilirubin  NEGATIVE    (NEGATIVE)  


 


Urine Urobilinogen  0.2    (<2.0)  EU/dL


 


Ur Leukocyte Esterase  NEGATIVE    (NEGATIVE)  


 


Urine HCG, Qual   NEGATIVE   (NEGATIVE)  














  12/15/19 12/15/19 Range/Units





  17:43 17:43 


 


WBC    (4.0-11.0)  K/uL


 


RBC    (4.30-5.90)  M/uL


 


Hgb    (12.0-16.0)  g/dL


 


Hct    (36.0-46.0)  %


 


MCV    (80.0-98.0)  fL


 


MCH    (27.0-32.0)  pg


 


MCHC    (31.0-37.0)  g/dL


 


RDW Std Deviation    (28.0-62.0)  fl


 


RDW Coeff of Fatoumata    (11.0-15.0)  %


 


Plt Count    (150-400)  K/uL


 


MPV    (7.40-12.00)  fL


 


Neut % (Auto)    (48.0-80.0)  %


 


Lymph % (Auto)    (16.0-40.0)  %


 


Mono % (Auto)    (0.0-15.0)  %


 


Eos % (Auto)    (0.0-7.0)  %


 


Baso % (Auto)    (0.0-1.5)  %


 


Neut # (Auto)    (1.4-5.7)  K/uL


 


Lymph # (Auto)    (0.6-2.4)  K/uL


 


Mono # (Auto)    (0.0-0.8)  K/uL


 


Eos # (Auto)    (0.0-0.7)  K/uL


 


Baso # (Auto)    (0.0-0.1)  K/uL


 


Nucleated RBC %    /100WBC


 


Nucleated RBCs #    K/uL


 


Sodium  140   (136-145)  mmol/L


 


Potassium  3.8   (3.5-5.1)  mmol/L


 


Chloride  102   ()  mmol/L


 


Carbon Dioxide  28.4   (21.0-32.0)  mmol/L


 


BUN  8   (7.0-18.0)  mg/dL


 


Creatinine  0.9   (0.6-1.0)  mg/dL


 


Est Cr Clr Drug Dosing  80.04   mL/min


 


Estimated GFR (MDRD)  > 60.0   ml/min


 


Glucose  78   ()  mg/dL


 


Calcium  8.6   (8.5-10.1)  mg/dL


 


Total Bilirubin  0.3   (0.2-1.0)  mg/dL


 


AST  15   (15-37)  IU/L


 


ALT  24   (14-63)  IU/L


 


Alkaline Phosphatase  129 H   ()  U/L


 


Total Protein  7.3   (6.4-8.2)  g/dL


 


Albumin  3.3 L   (3.4-5.0)  g/dL


 


Globulin  4.0   (2.6-4.0)  g/dL


 


Albumin/Globulin Ratio  0.8 L   (0.9-1.6)  


 


Lipase   89  ()  U/L


 


Urine Color    


 


Urine Appearance    


 


Urine pH    (5.0-8.0)  


 


Ur Specific Gravity    (1.001-1.035)  


 


Urine Protein    (NEGATIVE)  mg/dL


 


Urine Glucose (UA)    (NEGATIVE)  mg/dL


 


Urine Ketones    (NEGATIVE)  mg/dL


 


Urine Occult Blood    (NEGATIVE)  


 


Urine Nitrite    (NEGATIVE)  


 


Urine Bilirubin    (NEGATIVE)  


 


Urine Urobilinogen    (<2.0)  EU/dL


 


Ur Leukocyte Esterase    (NEGATIVE)  


 


Urine HCG, Qual    (NEGATIVE)  











Result Diagrams: 


 12/15/19 17:43





 12/15/19 17:43





Sepsis Event Note





- Evaluation


Sepsis Screening Result: No Definite Risk





- Focused Exam


Vital Signs: 


 Vital Signs











  Temp Pulse Resp BP Pulse Ox


 


 12/15/19 19:18   98  18  139/97 H  100


 


 12/15/19 18:02   95  18  135/85  98


 


 12/15/19 16:57  96.3 F  106 H  16  142/90 H  98











Date Exam was Performed: 12/15/19


Time Exam was Performed: 20:35





Consult PN Assessment/Plan


Procedures: 


Procedures





ASSAY OF AMYLASE (10/07/18)


ASSAY OF LIPASE (10/07/18)


ASSAY THYROID STIM HORMONE (19)


BLOOD TYPING SEROLOGIC ABO (18)


BLOOD TYPING SEROLOGIC RH(D) (18)


C-REACTIVE PROTEIN (10/07/18)


CHORIONIC GONADOTROPIN TEST (18)


COMPLETE CBC W/AUTO DIFF WBC (19)


COMPREHEN METABOLIC PANEL (19)


CT ABD & PELVIS W/O CONTRAST (18)


ECHO EXAM OF ABDOMEN (18)


ELECTROCARDIOGRAM TRACING (18)


EMERGENCY DEPT VISIT (19)


EMERGENCY DEPT VISIT (18)


EMERGENCY DEPT VISIT (17)


EMERGENCY DEPT VISIT (17)


HYDRATE IV INFUSION ADD-ON (19)


HYDRATION IV INFUSION INIT (18)


INJECTION FOR HIP X-RAY (18)


MRI JOINT LWR EXTR W/O&W/DYE (18)


MRI LUMBAR SPINE W/O DYE (18)


NEEDLE LOCALIZATION BY XRAY (18)


OB US < 14 WKS SINGLE FETUS (18)


OB US LIMITED FETUS(S) (19)


PT EVAL LOW COMPLEX 20 MIN (18)


ROUTINE VENIPUNCTURE (19)


THER/PROPH/DIAG INJ IV PUSH (19)


THER/PROPH/DIAG INJ SC/IM (17)


THER/PROPH/DIAG IV INF ADDON (18)


THER/PROPH/DIAG IV INF INIT (18)


TX/PRO/DX INJ NEW DRUG ADDON (10/07/18)


TX/PRO/DX INJ SAME DRUG ADON (18)


TX/PROPH/DG ADDL SEQ IV INF (18)


URINALYSIS AUTO W/O SCOPE (19)


URINALYSIS AUTO W/SCOPE (18)


URINE CULTURE/COLONY COUNT (19)


URINE PREGNANCY TEST (10/07/18)


US EXAM PELVIC COMPLETE (18)


VITAMIN B-12 (19)


VITAMIN D 25 HYDROXY (19)


X-RAY EXAM CHEST 1 VIEW (18)


X-RAY EXAM HIP UNI 2-3 VIEWS (18)


X-RAY EXAM OF SHOULDER (17)


X-RAY EXAM OF WRIST (18)


X-RAY EXAM UNILAT RIBS/CHEST (17)








(1) Exacerbation of Crohn's disease


SNOMED Code(s): 23067961


   Code(s): K50.90 - CROHN'S DISEASE, UNSPECIFIED, WITHOUT COMPLICATIONS   

Priority: High   Current Visit: Yes   


Qualifiers: 


   Digestive disease complication type: unspecified complication   Qualified 

Code(s): K50.919 - Crohn's disease, unspecified, with unspecified complications

   





(2) Abdominal pain


SNOMED Code(s): 97815035


   Code(s): R10.9 - UNSPECIFIED ABDOMINAL PAIN   Priority: Medium   Current 

Visit: No   


Qualifiers: 


   Abdominal location: right lower quadrant   Qualified Code(s): R10.31 - Right 

lower quadrant pain   


Problem List Initiated/Reviewed/Updated: Yes


Plan: 





CT scan and report have been personally reviewed.  I agree that I do not think 

there is any evidence for appendicitis.  She does have very marked bowel wall 

thickening consistent with exacerbation of her Crohn's disease.  Again, I do 

not feel that she has an acute surgical abdomen.  She would like to go home.  I 

strongly encouraged her to establish care with a physician at [facility] as her nurse practitioner has left.  I also think she would benefit from 

gas to a neurologic consultation and management of her Crohn's disease.

## 2019-12-15 NOTE — CT
INDICATION: Abdominal pain, nausea. History of Crohn`s disease.



TECHNIQUE: CT Abdomen and pelvis with i.v. contrast. Coronal and sagittal 

reformats were obtained.



CONTRAST: 100 mL Isovue 370



COMPARISON: 02/17/2018



FINDINGS: 



Lower chest: Unremarkable. 



Liver: Small ill-defined hypodensity is present in the left lobe of the 

liver, abutting the fissure for the ligamentum teres, which is most likely 

due to focal fatty infiltration or due to third inflow phenomenon. 



Spleen: Unremarkable. 



Pancreas: Unremarkable. 



Gallbladder: Unremarkable. 



Kidney: There is a 9 mm cyst present in the lower pole of the left kidney. 

Both kidneys are normal in enhancement. 



Adrenal: Unremarkable. 



Bowel: Moderate to severe wall thickening distal small bowel loops to the 

terminal ileum noted. On coronal image 31 there is a medially directed 

structure measuring 11 mm in diameter with mucosal enhancement seen and 

appears to communicate with the terminal ileum just proximal to the 

ileocecal valve. The appendix is unremarkable in appearance and best seen 

on images 104-113. Mild inflammatory changes are present along the right 

lower quadrant with 2 gas bubbles seen on image 94.



Vascular: Unremarkable. 



Lymph: Unremarkable. 



Peritoneum: Unremarkable. No pneumoperitoneum is seen. A small amount of 

pelvic ascites is noted. 



Pelvis: There is a cyst or follicle present in the left ovary measuring 1.9 

cm. 



Soft tissue: Unremarkable. 



Bone: Unremarkable for age. 



IMPRESSIONS: 



1. Moderate to severe wall thickening distal small bowel loops to the 

terminal ileum noted. Findings likely due to active Crohn`s disease.



2. On coronal image 31 there is a medially directed structure measuring 11 

mm in diameter with mucosal enhancement seen and appears to communicate 

with the terminal ileum just proximal to the ileocecal valve. This may 

represent a sinus tract associated with Crohn`s disease.



3. Mild inflammatory changes are present along the right lower quadrant 

with 2 gas bubbles seen on image 94. It is difficult to determine if these 

gas bubbles are intraluminal or extraluminal given the adjacent 

inflammatory changes.



The findings were discussed with Dr. Carter at 7:26 PM. 



Dictated by Ren Alba MD @ 12/15/2019 7:26:09 PM



Please note that all CT scans at this facility use dose modulation, 

iterative reconstruction, and/or weight-based dosing when appropriate to 

reduce radiation dose to as low as reasonably achievable.



Dictated by: Ren Alba MD @ 12/15/2019 19:26:33



(Electronically Signed)

## 2020-02-09 ENCOUNTER — HOSPITAL ENCOUNTER (EMERGENCY)
Dept: HOSPITAL 56 - MW.ED | Age: 25
LOS: 1 days | Discharge: HOME | End: 2020-02-10
Payer: COMMERCIAL

## 2020-02-09 VITALS — DIASTOLIC BLOOD PRESSURE: 78 MMHG | SYSTOLIC BLOOD PRESSURE: 128 MMHG

## 2020-02-09 DIAGNOSIS — Z79.899: ICD-10-CM

## 2020-02-09 DIAGNOSIS — J45.909: ICD-10-CM

## 2020-02-09 DIAGNOSIS — F41.9: ICD-10-CM

## 2020-02-09 DIAGNOSIS — K50.90: Primary | ICD-10-CM

## 2020-02-09 DIAGNOSIS — Z87.891: ICD-10-CM

## 2020-02-09 DIAGNOSIS — F32.9: ICD-10-CM

## 2020-02-09 DIAGNOSIS — Z88.8: ICD-10-CM

## 2020-02-09 DIAGNOSIS — Z88.2: ICD-10-CM

## 2020-02-09 DIAGNOSIS — Z91.018: ICD-10-CM

## 2020-02-09 LAB
BUN SERPL-MCNC: 11 MG/DL (ref 7–18)
CHLORIDE SERPL-SCNC: 102 MMOL/L (ref 98–107)
CO2 SERPL-SCNC: 28.6 MMOL/L (ref 21–32)
GLUCOSE SERPL-MCNC: 95 MG/DL (ref 74–106)
LIPASE SERPL-CCNC: 116 U/L (ref 73–393)
POTASSIUM SERPL-SCNC: 3.3 MMOL/L (ref 3.5–5.1)
SODIUM SERPL-SCNC: 139 MMOL/L (ref 136–145)

## 2020-02-09 PROCEDURE — 99284 EMERGENCY DEPT VISIT MOD MDM: CPT

## 2020-02-09 PROCEDURE — 74177 CT ABD & PELVIS W/CONTRAST: CPT

## 2020-02-09 PROCEDURE — 86140 C-REACTIVE PROTEIN: CPT

## 2020-02-09 PROCEDURE — 85652 RBC SED RATE AUTOMATED: CPT

## 2020-02-09 PROCEDURE — 80053 COMPREHEN METABOLIC PANEL: CPT

## 2020-02-09 PROCEDURE — 96361 HYDRATE IV INFUSION ADD-ON: CPT

## 2020-02-09 PROCEDURE — 83690 ASSAY OF LIPASE: CPT

## 2020-02-09 PROCEDURE — 80305 DRUG TEST PRSMV DIR OPT OBS: CPT

## 2020-02-09 PROCEDURE — 96374 THER/PROPH/DIAG INJ IV PUSH: CPT

## 2020-02-09 PROCEDURE — 96375 TX/PRO/DX INJ NEW DRUG ADDON: CPT

## 2020-02-09 PROCEDURE — 85025 COMPLETE CBC W/AUTO DIFF WBC: CPT

## 2020-02-09 PROCEDURE — 36415 COLL VENOUS BLD VENIPUNCTURE: CPT

## 2020-02-09 PROCEDURE — 81001 URINALYSIS AUTO W/SCOPE: CPT

## 2020-02-09 PROCEDURE — 83605 ASSAY OF LACTIC ACID: CPT

## 2020-02-09 NOTE — EDM.PDOC
ED HPI GENERAL MEDICAL PROBLEM





- General


Chief Complaint: Abdominal Pain


Stated Complaint: stomach pain


Time Seen by Provider: 20 20:15





- History of Present Illness


INITIAL COMMENTS - FREE TEXT/NARRATIVE: 





HPI


24-year-old female presents for evaluation of worsened broad upper abdominal 

burning pain that is waxing waning in nature was more significant this evening 

and is accompanied by nausea and vomiting, relieved with hot showers (now with 

decreased efficacy), is without further symptoms. Patient reports that she is 

previously trialed antacids without improvement, has had a negative HIDA scan, 

and is been diagnosed with Crohns disease by colonoscopy for which she is 

treated with 40 mg prednisone daily. Patient has pending care with Dr. Steph Wilde at [facility] in [facility] on 20.





PCP: Sonya Bermudez RN, BSN, MS, WHNP-BC





M/S/F/SocHx notable for: please see HPI; remainder reviewed with patient and in 

chart. 





ROS: Negative constitutional, eye, cardiovascular, pulmonary, GI, , MSK, skin

, neurologic, psychiatric, endocrine unless noted in the HPI.





Exam


, RR 18, /9 one, T 36.7C, SaO2 97% on room air.


Gen: Pleasant, non-toxic appearing, resting co in mild discomfort mfortably.


HEENT: NC, AT, PEERL, EOMI.


Resp: Clear to auscultation bilaterally, normal work of breathing, no accessory 

muscle usage.


Card: Regular rate and rhythm with no murmurs, rubs, or gallops, extremities 

warm and well perfused. 


GI: Non-tender to palpation throughout all quadrants, no focal tenderness at 

McBurney's point, negative Parsons's sign, non-distended, no rebound or guarding.


: No suprapubic tenderness to palpation.


MSK: No visible deformities, strength and tone without visually appreciable 

deficit.


Skin: Normal color with no visible lesions.


Neuro: alert and oriented  3, no facial asymmetry, vision and hearing WNL.


Psych: unusual mood and affect





Labs / Imaging:


WBC 19.07, HB 12.8, ESR 7, lactic acid 1.3, sodium 139, potassium 3.3, AST 13, 

LT 22, total bilirubin 0.3, alkaline phosphatase 95, lipase 116, CRP 0.80.





UA with negative nitrate, small leukocyte esterase, 3-6 WBC, few epithelial 

cells, few bacteria.





UDS negative.





CT Abd/Pelvis: Thickening of the wall of the distal ileum, findings most 

consistent with


active Crohn's disease. Mild hepatomegaly.





MDM


Previous chart, nursing note, labs, imaging, and vitals reviewed. 


A: 24-year-old female presents for evaluation of worsened broad upper abdominal 

burning pain that is waxing waning in nature was more significant this evening 

and is accompanied by nausea and vomiting, relieved with hot showers (now with 

decreased efficacy), is without further symptoms.





DDx: pancreatitis, biliary disease (cholelithiasis, cholecystitis, 

choledocholithiasis, cholangitis, sphincter of Oddi dysfunction, gastritis, GERD

, ulcer, gastroenteritis, acute appendicitis, nephrolithiasis, pyelonephritis, 

UTI, pregnancy/ectopic pregnancy, cannabinoid hyperemesis syndrome.





Evaluation: imaging labs consistent with Crohns disease exacerbation. The 

leukocytosis is likely due to a combination of her Crohns exacerbation as well 

as secondary to her steroid use (40 mg prednisone daily). Reassuringly the 

patients ESR and CRP are within normal limits. The patient was given 1 L NS, 4 

mg Zofran, and hydromorphone for initial symptom control. After the above 

results were noted Dr. Joel, the GI physician on call for Dr. Wilde, was 

consulted by phone, laboratory science, history, exam, and imaging were 

reviewed. [place] 4 g daily was recommended in addition to pain control (

resuming patient is taking p.o. well). With follow-up with Dr. Wilde on Tuesday. 

Mesalamine is not presently available at this facility, the patient was given 1 

L NS for additional hydration, switch to oral analgesics (10 mg oxycodone), and 

was able to take PO well. Repeat evaluation 11:40 PM with patient resting 

comfortably, minimal tenderness on abdominal exam, and no rebound or guarding. 

Patient is comfortable with the plan and will be prescribed mesalamine for 

outpatient usage. Return to care precautions provided.





Impression: abdominal pain, Crohns disease exacerbation


  ** Middle Abdomen


Pain Score (Numeric/FACES): 6





- Related Data


 Allergies











Allergy/AdvReac Type Severity Reaction Status Date / Time


 


budesonide Allergy  Numbness Verified 20 20:22


 


garlic Allergy  Shortness Verified 20 20:22





   of Breath  


 


NSAIDS (Non-Steroidal Allergy  Nausea Verified 20 20:25





Anti-Inflamma     


 


prednisone Allergy  Rash Verified 20 20:25


 


Sulfa (Sulfonamide Allergy  Rash Verified 20 20:22





Antibiotics)     


 


pine trees Allergy  Hives Uncoded 12/15/19 16:55











Home Meds: 


 Home Meds





buPROPion [Wellbutrin] 300 mg PO DAILY 18 [History]


Ondansetron [Zofran ODT] 4 mg PO Q6H PRN 19 [History]


ClonazePAM [KlonoPIN] 0.5 mg PO ASDIRECTED 12/15/19 [History]


FLUoxetine [PROzac] 10 mg PO DAILY 12/15/19 [History]


Escitalopram [Lexapro] 20 mg PO DAILY 20 [History]


Hydrocodone/Acetaminophen [Norco 5-325 Tablet] 1 - 2 each PO Q6H PRN #20 tablet 

20 [Rx]


Mesalamine 4 gm RC DAILY #8 enema 20 [Rx]


Ondansetron [Zofran ODT] 4 mg PO Q6H PRN #20 tab.dis 20 [Rx]


predniSONE [Prednisone] 20 mg PO DAILY 20 [History]











Past Medical History





- Past Health History


Medical/Surgical History: Denies Medical/Surgical History


HEENT History: Reports: None


Cardiovascular History: Reports: None


Respiratory History: Reports: Asthma


Gastrointestinal History: Reports: Other (See Below)


Other Gastrointestinal History: Crohn's Disease


Genitourinary History: Reports: Renal Calculus


OB/GYN History: Reports: Pregnancy


Musculoskeletal History: Reports: Back Pain, Chronic


Neurological History: Reports: None


Psychiatric History: Reports: Anxiety, Depression, OCD


Endocrine/Metabolic History: Reports: None


Hematologic History: Reports: None


Oncologic (Cancer) History: Reports: None


Dermatologic History: Reports: None





- Infectious Disease History


Infectious Disease History: Reports: None





- Past Surgical History


GI Surgical History: Reports: Colonoscopy, EGD


Female  Surgical History: Reports: None





Social & Family History





- Family History


Family Medical History: Noncontributory


Other GI Family History: Paternal Grandmother  from Crohn's disease.


Other Dermatologic Family History: ovarian CA


Oncologic: Reports: Ovarian





- Tobacco Use


Smoking Status *Q: Former Smoker


Used Tobacco, but Quit: Yes


Month/Year Tobacco Last Used: 2020





- Caffeine Use


Caffeine Use: Reports: Coffee





- Recreational Drug Use


Recreational Drug Use: No





ED ROS GENERAL





- Review of Systems


Review Of Systems: See Below





ED EXAM, GENERAL





- Physical Exam


Exam: See Below





Course





- Vital Signs


Last Recorded V/S: 





 Last Vital Signs











Temp  36.7 C   20 20:19


 


Pulse  93   20 21:55


 


Resp  16   20 21:55


 


BP  128/78   20 21:55


 


Pulse Ox  100   20 21:55














- Orders/Labs/Meds


Orders: 





 Active Orders 24 hr











 Category Date Time Status


 


 Communication Order [RC] STAT Care  20 22:45 Active


 


 HYDROmorphone [Dilaudid] Med  20 20:36 Active





 0.5 - 1 mg IVPUSH Q1H PRN   


 


 Sodium Chloride 0.9% [Normal Saline] 1,000 ml Med  20 22:44 Active





 IV .Bolus   








 Medication Orders





Hydromorphone HCl (Dilaudid)  0.5 - 1 mg IVPUSH Q1H PRN


   PRN Reason: Pain


   Last Admin: 20 20:51  Dose: 1 mg


Sodium Chloride (Normal Saline)  1,000 mls @ 1,000 mls/hr IV .Bolus ONE


   Stop: 20 23:43


   Last Admin: 20 23:05  Dose: 1,000 mls/hr








Labs: 





 Laboratory Tests











  20 Range/Units





  20:18 20:18 20:42 


 


WBC    19.07 H  (4.0-11.0)  K/uL


 


RBC    4.45  (4.30-5.90)  M/uL


 


Hgb    12.8  (12.0-16.0)  g/dL


 


Hct    39.0  (36.0-46.0)  %


 


MCV    87.6  (80.0-98.0)  fL


 


MCH    28.8  (27.0-32.0)  pg


 


MCHC    32.8  (31.0-37.0)  g/dL


 


RDW Std Deviation    55.2  (28.0-62.0)  fl


 


RDW Coeff of Fatoumata    17 H  (11.0-15.0)  %


 


Plt Count    452 H  (150-400)  K/uL


 


MPV    11.00  (7.40-12.00)  fL


 


Add Manual Diff    YES  


 


Neutrophils % (Manual)    76  (48.0-80.0)  %


 


Lymphocytes % (Manual)    17  (16.0-40.0)  %


 


Monocytes % (Manual)    7  (0.0-15.0)  %


 


Nucleated RBC %    0.0  /100WBC


 


Absolute Seg Neuts    14.5 H  (1.4-5.7)  


 


Lymphocytes # (Manual)    3.2 H  (0.6-2.4)  


 


Monocytes # (Manual)    1.3 H  (0.0-0.8)  


 


Nucleated RBCs #    0  K/uL


 


ESR     (0-19)  mm/hr


 


Lactate     (0.20-2.00)  mmol/L


 


Sodium     (136-145)  mmol/L


 


Potassium     (3.5-5.1)  mmol/L


 


Chloride     ()  mmol/L


 


Carbon Dioxide     (21.0-32.0)  mmol/L


 


BUN     (7.0-18.0)  mg/dL


 


Creatinine     (0.6-1.0)  mg/dL


 


Est Cr Clr Drug Dosing     mL/min


 


Estimated GFR (MDRD)     ml/min


 


Glucose     ()  mg/dL


 


Calcium     (8.5-10.1)  mg/dL


 


Total Bilirubin     (0.2-1.0)  mg/dL


 


AST     (15-37)  IU/L


 


ALT     (14-63)  IU/L


 


Alkaline Phosphatase     ()  U/L


 


C-Reactive Protein     (0.00-0.90)  mg/dL


 


Total Protein     (6.4-8.2)  g/dL


 


Albumin     (3.4-5.0)  g/dL


 


Globulin     (2.6-4.0)  g/dL


 


Albumin/Globulin Ratio     (0.9-1.6)  


 


Lipase     ()  U/L


 


Urine Color  YELLOW    


 


Urine Appearance  SLT CLOUDY    


 


Urine pH  6.0    (5.0-8.0)  


 


Ur Specific Gravity  1.010    (1.001-1.035)  


 


Urine Protein  NEGATIVE    (NEGATIVE)  mg/dL


 


Urine Glucose (UA)  NEGATIVE    (NEGATIVE)  mg/dL


 


Urine Ketones  NEGATIVE    (NEGATIVE)  mg/dL


 


Urine Occult Blood  NEGATIVE    (NEGATIVE)  


 


Urine Nitrite  NEGATIVE    (NEGATIVE)  


 


Urine Bilirubin  NEGATIVE    (NEGATIVE)  


 


Urine Urobilinogen  0.2    (<2.0)  EU/dL


 


Ur Leukocyte Esterase  SMALL H    (NEGATIVE)  


 


Urine RBC  0-1    (0-2/HPF)  


 


Urine WBC  3-6    (0-5/HPF)  


 


Ur Epithelial Cells  FEW    (NONE-FEW)  


 


Urine Bacteria  FEW    (NEGATIVE)  


 


Urine Opiates Screen   NEGATIVE   (NEGATIVE)  


 


Ur Oxycodone Screen   NEGATIVE   (NEGATIVE)  


 


Urine Methadone Screen   NEGATIVE   (NEGATIVE)  


 


Ur Barbiturates Screen   NEGATIVE   (NEGATIVE)  


 


Ur Phencyclidine Scrn   NEGATIVE   (NEGATIVE)  


 


Ur Amphetamine Screen   NEGATIVE   (NEGATIVE)  


 


U Methamphetamines Scrn   NEGATIVE   (NEGATIVE)  


 


U Benzodiazepines Scrn   NEGATIVE   (NEGATIVE)  


 


U Cocaine Metab Screen   NEGATIVE   (NEGATIVE)  


 


U Marijuana (THC) Screen   NEGATIVE   (NEGATIVE)  














  20 Range/Units





  20:42 20:42 20:42 


 


WBC     (4.0-11.0)  K/uL


 


RBC     (4.30-5.90)  M/uL


 


Hgb     (12.0-16.0)  g/dL


 


Hct     (36.0-46.0)  %


 


MCV     (80.0-98.0)  fL


 


MCH     (27.0-32.0)  pg


 


MCHC     (31.0-37.0)  g/dL


 


RDW Std Deviation     (28.0-62.0)  fl


 


RDW Coeff of Fatoumata     (11.0-15.0)  %


 


Plt Count     (150-400)  K/uL


 


MPV     (7.40-12.00)  fL


 


Add Manual Diff     


 


Neutrophils % (Manual)     (48.0-80.0)  %


 


Lymphocytes % (Manual)     (16.0-40.0)  %


 


Monocytes % (Manual)     (0.0-15.0)  %


 


Nucleated RBC %     /100WBC


 


Absolute Seg Neuts     (1.4-5.7)  


 


Lymphocytes # (Manual)     (0.6-2.4)  


 


Monocytes # (Manual)     (0.0-0.8)  


 


Nucleated RBCs #     K/uL


 


ESR   7   (0-19)  mm/hr


 


Lactate    1.3  (0.20-2.00)  mmol/L


 


Sodium  139    (136-145)  mmol/L


 


Potassium  3.3 L    (3.5-5.1)  mmol/L


 


Chloride  102    ()  mmol/L


 


Carbon Dioxide  28.6    (21.0-32.0)  mmol/L


 


BUN  11    (7.0-18.0)  mg/dL


 


Creatinine  0.9    (0.6-1.0)  mg/dL


 


Est Cr Clr Drug Dosing  78.63    mL/min


 


Estimated GFR (MDRD)  > 60.0    ml/min


 


Glucose  95    ()  mg/dL


 


Calcium  9.1    (8.5-10.1)  mg/dL


 


Total Bilirubin  0.3    (0.2-1.0)  mg/dL


 


AST  13 L    (15-37)  IU/L


 


ALT  22    (14-63)  IU/L


 


Alkaline Phosphatase  95    ()  U/L


 


C-Reactive Protein  0.80    (0.00-0.90)  mg/dL


 


Total Protein  6.8    (6.4-8.2)  g/dL


 


Albumin  3.3 L    (3.4-5.0)  g/dL


 


Globulin  3.5    (2.6-4.0)  g/dL


 


Albumin/Globulin Ratio  0.9    (0.9-1.6)  


 


Lipase  116    ()  U/L


 


Urine Color     


 


Urine Appearance     


 


Urine pH     (5.0-8.0)  


 


Ur Specific Gravity     (1.001-1.035)  


 


Urine Protein     (NEGATIVE)  mg/dL


 


Urine Glucose (UA)     (NEGATIVE)  mg/dL


 


Urine Ketones     (NEGATIVE)  mg/dL


 


Urine Occult Blood     (NEGATIVE)  


 


Urine Nitrite     (NEGATIVE)  


 


Urine Bilirubin     (NEGATIVE)  


 


Urine Urobilinogen     (<2.0)  EU/dL


 


Ur Leukocyte Esterase     (NEGATIVE)  


 


Urine RBC     (0-2/HPF)  


 


Urine WBC     (0-5/HPF)  


 


Ur Epithelial Cells     (NONE-FEW)  


 


Urine Bacteria     (NEGATIVE)  


 


Urine Opiates Screen     (NEGATIVE)  


 


Ur Oxycodone Screen     (NEGATIVE)  


 


Urine Methadone Screen     (NEGATIVE)  


 


Ur Barbiturates Screen     (NEGATIVE)  


 


Ur Phencyclidine Scrn     (NEGATIVE)  


 


Ur Amphetamine Screen     (NEGATIVE)  


 


U Methamphetamines Scrn     (NEGATIVE)  


 


U Benzodiazepines Scrn     (NEGATIVE)  


 


U Cocaine Metab Screen     (NEGATIVE)  


 


U Marijuana (THC) Screen     (NEGATIVE)  











Meds: 





Medications











Generic Name Dose Route Start Last Admin





  Trade Name Freq  PRN Reason Stop Dose Admin


 


Hydromorphone HCl  0.5 - 1 mg  20 20:36  20 20:51





  Dilaudid  IVPUSH   1 mg





  Q1H PRN   Administration





  Pain   





     





     





     


 


Sodium Chloride  1,000 mls @ 1,000 mls/hr  20 22:44  20 23:05





  Normal Saline  IV  20 23:43  1,000 mls/hr





  .Bolus ONE   Administration





     





     





     





     














Discontinued Medications














Generic Name Dose Route Start Last Admin





  Trade Name Freq  PRN Reason Stop Dose Admin


 


Hydromorphone HCl  0.5 - 1 mg  20 20:30  





  Dilaudid  IVPUSH   





  Q1H PRN   





  Pain   





     





     





     


 


Sodium Chloride  1,000 mls @ 1,000 mls/hr  20 20:30  20 20:48





  Normal Saline  IV  20 21:29  1,000 mls/hr





  .Bolus ONE   Administration





     





     





     





     


 


Iopamidol  100 ml  20 21:12  20 21:36





  Isovue-370 (76%)  IVPUSH  20 21:13  100 ml





  ONETIME STA   Administration





     





     





     





     


 


Mesalamine  4 gm  20 22:46  20 23:18





  [place]  RECTAL  20 22:47  Not Given





  ONETIME ONE   





     





     





     





     


 


Ondansetron HCl  4 mg  20 20:30  20 20:49





  Zofran  IVPUSH  20 20:31  4 mg





  ONETIME ONE   Administration





     





     





     





     


 


Oxycodone HCl  10 mg  20 22:44  20 23:05





  Oxycodone  PO  20 22:45  10 mg





  ONETIME ONE   Administration





     





     





     





     














Departure





- Departure


Time of Disposition: 23:42


Disposition: Home, Self-Care 01


Clinical Impression: 


 Exacerbation of Crohn's disease








- Discharge Information


Prescriptions: 


Hydrocodone/Acetaminophen [Norco 5-325 Tablet] 1 - 2 each PO Q6H PRN #20 tablet


 PRN Reason: Pain


Mesalamine 4 gm RC DAILY #8 enema


Ondansetron [Zofran ODT] 4 mg PO Q6H PRN #20 tab.dis


 PRN Reason: Nausea


Referrals: 


Sonya Bermudez, NP [Primary Care Provider] - 


Additional Instructions: 


You were in seen in the [facility] 

Emergency Department for evaluation of abdominal pain, your found have an 

exacerbation of your underlying Crohns disease. You have been prescribed 

mesalamine to take daily. You have also been prescribed Zofran for nausea and 

Norco for pain control. Please keep your follow-up care with Dr. Wilde on 2/12/

20. Please read and follow all of the instructions below.





Please follow up with your primary care physician as needed. When calling for 

follow-up care, please make the office aware that this follow-up is from your 

recent emergency room visit. If for any reason you are refused follow-up, 

please contact the [facility] Emergency 

Department at [phone] and asked to speak to the emergency department 

charge nurse. 





Your care today was limited to identifying and treating emergent medical 

problems only. Many people have subtle differences in their test results that 

require follow up with their outpatient physician(s) to correctly determine if 

this represents a normal variation or concerning abnormality with respect to 

your specific health. The care given to you today was limited to identifying 

and treating emergent medical problems - you need to request a copy of all of 

your medical records from today's visit and follow up with your outpatient 

physician(s) to review both today's visit and your overall health. If you have 

any new symptoms or if you are at all concerned about your health please return 

immediately to the emergency department.





Abdominal Pain


The exact cause of your abdominal pain is not certain. Based upon the testing 

today you are felt to be at low risk for discharge. There are no current signs 

of a life threatening illness or injury. Your condition does not seem serious 

now; however, sometimes the signs of a serious problem may take more time to 

appear. For this reason, it is important for you to watch for any new symptoms, 

problems, or worsening of your condition. Over the next few days, the abdominal 

pain may come and go, or be continuous. Other common symptoms can include 

nausea and vomiting. Sometimes it can be difficult to tell if you feel nauseous

, you may just feel bad and not associate that feeling with nausea. Constipation

, diarrhea, and a fever may go along with the pain. The pain may continue even 

if treated correctly over the following days. Depending on how things go, 

sometimes the cause can become clear and may require further or different 

treatment. Additional evaluations, medications, or tests may be needed.





If your symptoms do not worsen but you are still having pain after 12-24 hours, 

please call your primary care physician to arrange for further evaluation. 





Return to the emergency department if any of the following occur:


   Pain gets worse or moves to the right lower abdomen


   New or worsening vomiting or diarrhea


   Swelling of the abdomen


   Unable to pass gas or stool for more than 8 hours


   Fever of 100.4F (38C) or higher, or as directed by your healthcare 

provider.


   Blood in vomit or bowel movements (dark red or black color)


   If you have yellow skin or eyes or if you have dark brown urine.


   Weakness, dizziness


   Chest, arm, back, neck or jaw pain


   Unexpected vaginal bleeding or missed period


   Trouble breathing


   Confusion


   Fainting or loss of consciousness


   Rapid heart rate


   Seizure


   If you are light headed upon standing or passing out. 


   If you are otherwise concerned about your health.





Home Care


   Do not force yourself to eat, especially if having cramps, vomiting, or 

diarrhea.


   Water is important so you do not get dehydrated. Soup may also be good. 

Sports drinks may also help, especially if they are not too acidic. Make sure 

you don't drink sugary drinks as this can make things worse. Take liquids in 

small amounts. 


   Caffeine sometimes makes the pain and cramping worse.


   Avoid dairy products if you have vomiting or diarrhea.


   Don't eat large amounts at a time. Wait a few minutes between bites.


   Eat a diet low in fiber (called a low-residue diet). Foods allowed include 

refined breads, white rice, fruit and vegetable juices without pulp, tender 

meats. These foods will pass more easily through the intestine.


Avoid whole-grain foods, whole fruits and vegetables, meats, seeds and nuts, 

fried or fatty foods, dairy, alcohol and spicy foods until your symptoms go 

away.








Hydrocodone/Acetaminophen (Brand Names: Norco, Vicodin)


   Take as directed on the prescription for relief of pain. 


   This product contains acetaminophen (Tylenol) do not use it with other 

Acetaminophen containing medications.


   This drug may cause mild nausea, if so you may take it with a small snack.


   This drug will cause constipation, if you experience a decrease in bowel 

movements purchase "Senna-S" (sennasides and docusate) which is available over 

the counter at pharmacies and take as directed on the bottle. Call your 

physician if you have not had bowel movemen in two days. 


   This drug may cause fatigue - do not drive or engage in other hazardous 

activities when using this medication.


   Do no drink alcohol when using this medication. 


   Store this drug safely, it is a high risk medication if misused. 





SIDE EFFECTS: Tell your doctor immediately if any of these unlikely but serious 

side effects occur: mental/mood changes, severe stomach/abdominal pain, 

difficulty urinating. Seek immediate medical attention if any of these rare but 

serious side effects occur: fainting, seizure, slow/shallow breathing, unusual 

drowsiness/difficulty waking up. Taking more than the recommended dose of 

acetaminophen may cause serious (possibly fatal) liver disease. Seek immediate 

medical attention if you have any symptoms of liver damage, including: dark 

urine, persistent nausea/vomiting, stomach/abdominal pain, yellowing eyes/skin. 

A very serious allergic reaction to this drug is rare. However, seek immediate 

medical attention if you notice any symptoms of a serious allergic reaction, 

including: rash, itching/swelling (especially of the face/tongue/throat), 

severe dizziness, trouble breathing. This is not a complete list of possible 

side effects.





PRECAUTIONS: Before taking this medication, tell your doctor or pharmacist if 

you are allergic to it; or to other narcotics (such as morphine, codeine); or 

if you have any other allergies. This product may contain inactive ingredients, 

which can cause allergic reactions or other problems. Talk to your pharmacist 

for more details. Before using this medication, tell your doctor or pharmacist 

your medical history, especially of: brain disorders (such as head injury, tumor

, seizures), breathing problems (such as asthma, sleep apnea, chronic 

obstructive pulmonary disease-COPD), kidney disease, liver disease, mental/mood 

disorders (such as confusion, depression), personal or family history of 

regular use/abuse of drugs/alcohol, stomach/intestinal problems (such as 

blockage, constipation, diarrhea due to infection, paralytic ileus), difficulty 

urinating (such as due to enlarged prostate). This drug may make you dizzy or 

drowsy. Avoid alcoholic beverages. Acetaminophen may cause liver damage. Daily 

use of alcohol, especially when combined with acetaminophen, may increase your 

risk for liver damage. Caution is advised if you have diabetes, alcohol 

dependence, liver disease, phenylketonuria (PKU), or any other condition that 

requires you to limit/avoid these substances in your diet. Ask your doctor or 

pharmacist about using this product safely. Older adults may be more sensitive 

to the effects of this drug, especially dizziness, drowsiness, urinary 

problems. During pregnancy, this medication should be used only when clearly 

needed. Using it for long periods or in high doses near the expected delivery 

date is not recommended because of the potential for harm to the unborn baby. 

Discuss the risks and benefits with your doctor. Babies born to mothers who 

have used this medication for an extended time may have withdrawal symptoms 

such as irritability, abnormal/persistent crying, vomiting, or diarrhea. If you 

notice any of these symptoms in your , tell the doctor promptly. This 

medication passes into breast milk and may rarely have undesirable effects on a 

nursing infant. Tell the doctor immediately if your baby develops unusual 

sleepiness, difficulty feeding, or trouble breathing. Consult your doctor 

before breast-feeding. 








Ondansetron (Brand Name: Zofran)


   Take one tablet every 6 hours as needed for nausea





SIDE EFFECTS: Headache, fever, lightheadedness, dizziness, drowsiness, tiredness

, constipation. If these effects persist or worsen, notify your doctor 

promptly. Many people using this medication do not have serious side effects. 

Tell your doctor right away if you have any serious side effects, including: 

stomach pain, muscle stiffness/spasm, vision changes (e.g., temporary loss of 

vision, blurred vision, uncontrollable eye movements). Get medical help right 

away if any of these rare but very serious side effects occur: chest pain, 

fainting, slow/fast/irregular heartbeat. A very serious allergic reaction to 

this drug is rare. However, get medical help right away if you notice any of 

the following symptoms of a serious allergic reaction: rash, itching/swelling (

especially of the face/tongue/throat), severe dizziness, trouble breathing. 

This is not a complete list of possible side effects. If you notice other 

effects not listed above, contact your doctor or pharmacist. 





PRECAUTIONS: Before using ondansetron, tell your doctor or pharmacist if you 

are allergic to it; or to other serotonin blockers (e.g., granisetron); or if 

you have any other allergies. This product may contain inactive ingredients, 

which can cause allergic reactions or other problems. Talk to your pharmacist 

for more details. Before using this medication, tell your doctor or pharmacist 

your medical history, especially of: irregular heartbeat, liver disease, stomach

/intestinal problems (e.g., recent abdominal surgery, ileus, swelling). 

Ondansetron may cause a condition that affects the heart rhythm (QT prolongation

). QT prolongation can infrequently result in serious (rarely fatal) fast/

irregular heartbeat and other symptoms (such as severe dizziness, fainting) 

that require immediate medical attention. The risk of QT prolongation may be 

increased if you have certain medical conditions or are taking other drugs that 

may affect the heart rhythm (see also Drug Interactions section). Before using 

ondansetron, tell your doctor or pharmacist if you have any of the following 

conditions: certain heart problems (heart failure, slow heartbeat, QT 

prolongation in the EKG), family history of certain heart problems (QT 

prolongation in the EKG, sudden cardiac death). Low levels of potassium or 

magnesium in the blood may also increase your risk of QT prolongation. This 

risk may increase if you use certain drugs (such as diuretics/"water pills") or 

if you have conditions such as severe sweating, diarrhea, or vomiting. Talk to 

your doctor about using ondansetron safely. This drug may make you dizzy or 

drowsy or cause blurred vision. Do not drive, use machinery, or do any activity 

that requires alertness or clear vision until you are sure you can perform such 

activities safely. Limit alcoholic beverages. Infants younger than 5 months may 

be more sensitive to the effects of this drug, especially diarrhea. During 

pregnancy, this medication should be used only when clearly needed. Discuss the 

risks and benefits with your doctor. It is not known if this drug passes into 

breast milk. Consult your doctor before breast-feeding. 





DRUG INTERACTIONS: Drug interactions may change how your medications work or 

increase your risk for serious side effects. This document does not contain all 

possible drug interactions. Keep a list of all the products you use (including 

prescription/nonprescription drugs and herbal products) and share it with your 

doctor and pharmacist. Do not start, stop, or change the dosage of any 

medicines without your doctor's approval. Some products that may interact with 

this drug include: apomorphine, tramadol. Many drugs besides ondansetron may 

affect the heart rhythm (QT prolongation), including dofetilide, pimozide, 

procainamide, amiodarone, quinidine, sotalol, macrolide antibiotics (such as 

erythromycin), among others. Therefore, before using ondansetron, report all 

medications you are currently using to your doctor or pharmacist.





Prescriptions:


If you are uninsured or have financial difficulties with filling your 

prescription(s), you may consider using a free pharmacy discount service such 

as [employer] ([employer]) or [employer] ([employer] These services allow 

you to search for a medication on your phone (or computer) and obtain a coupon 

that usually has a significant discount from the list price at a pharmacy. Your 

physician as well as [facility] does not have a financial 

relationship with either of these services. You may also wish to speak with 

your physician to determine if lower cost prescriptions are possible.





Obtaining primary care:


1.   [facility] provides pediatrics (children), 

family medicine (children, adults, and some obstetrical care), and internal 

medicine (adults). Further specialty care is also available. Same day 

appointments are available. They may be contacted at [phone] and are open 

Monday through Friday 8 AM to 5 PM. The [facility] are 

located at [facility], [address] [place] [address].


2.   [facility] offers family medicine, internal 

medicine, womens health, and further specialty care. [facility] 

may be contacted at [phone]. [facility] is 

located at [address] [place], [address].


3.   If you have health insurance, please also contact your insurer for a list 

of accepting providers under your policy, you may contact these providers for 

further health care.





Occupational health:


Work related injuries may consider following up with [facility], [phone]. Occupational health services are located at [address] [place] [address] and are open Monday through Friday from 7:

30 am to 5:00 pm.





Obstetrical and Gynecological Care:


[facility], [phone], Monday through Friday 8 AM to 

5 PM. [address] [place] [address].





Eyecare:


If you have an eye injury you should follow up with your ophthalmologist or 

with [facility], at [phone] or [phone]

, they are located at [address] [place] [address].





Dental Care 


   Orlin COX DDS. [address] [place], [place]. Ph. [phone]


   Saravanan COX DDS MS. [address] [facility] [address], [place], [place]. Ph. [phone]


   Harley KELLY DDS. [address] [place] [address], [place], [place]. Ph. [phone]


   Arya Julio DDS. [address] [facility] [address] [place], [place]. Ph. [phone]


   [facility]. [address] [facility] [place], [place]. Ph. [phone]


   Shauna SILVESTRE DDS. [address] [facility]. Ph. [phone]


   [facility]. [address], [place]. Ph. [phone]


   [facility]. [address] [place] [address], [place], [place]. 

Ph. [phone]


   [facility] [address] [place] [address]. Ph. [phone]


   [facility]. [address] [place] [address] [address]. [place], [place]. Ph. [phone]


[facility].  [address], [place], [place]. Ph. [phone]








Sepsis Event Note





- Evaluation


Sepsis Screening Result: No Definite Risk





- Focused Exam


Vital Signs: 





 Vital Signs











  Temp Pulse Resp BP Pulse Ox


 


 20 21:55   93  16  128/78  100


 


 20 20:19  36.7 C  118 H  18  128/91 H  97











Date Exam was Performed: 20


Time Exam was Performed: 23:42





- My Orders


Last 24 Hours: 





My Active Orders





20 20:36


HYDROmorphone [Dilaudid]   0.5 - 1 mg IVPUSH Q1H PRN 





20 22:44


Sodium Chloride 0.9% [Normal Saline] 1,000 ml IV .Bolus 





20 22:45


Communication Order [RC] STAT 














- Assessment/Plan


Last 24 Hours: 





My Active Orders





20 20:36


HYDROmorphone [Dilaudid]   0.5 - 1 mg IVPUSH Q1H PRN 





20 22:44


Sodium Chloride 0.9% [Normal Saline] 1,000 ml IV .Bolus 





20 22:45


Communication Order [RC] STAT

## 2020-02-09 NOTE — CT
Indication:



Abdominal pain. History of Crohn`s. 



Technique:



Multiple contiguous axial images were obtained from the lung bases through 

the symphysis pubis after the intravenous administration of 100 milliliters 

Isovue 370. 



Please note that all CT scans at this facility use dose modulation, 

iterative reconstruction, and/or weight-based dosing when appropriate to 

reduce radiation dose to as low as reasonably achievable. 



Comparison:



December 15, 2019.



Findings:



The lung bases are clear. No infiltrate, pleural effusion, or pneumothorax 

is identified. Heart is normal in size. No pericardial effusions 

identified.



The liver, spleen, pancreas, adrenals, and kidneys are normal. The 

gallbladder is the contracted. No intrahepatic biliary ductal dilatation is 

identified. The liver is mildly enlarged measuring 21 centimeters in 

maximum dimension. No hydronephrosis is identified. 



In the pelvis, the uterus has a grossly normal morphology. The urinary 

bladder is grossly normal. 



The wall of the distal ileum is thickened. This is most consistent with 

Crohn`s disease. Mild inflammatory changes are identified in the right 

lower quadrant. Moderate amount of stool is identified within the colon. No 

free air is identified. The aorta is normal in caliber. No lytic or blastic 

lesions of the spine are identified. 



Impression:



Thickening of the wall of the distal ileum, findings most consistent with 

active Crohn`s disease.



Mild hepatomegaly.



Please note that all CT scans at this facility use dose modulation, 

iterative reconstruction, and/or weight-based dosing when appropriate to 

reduce radiation dose to as low as reasonably achievable.



Dictated by Nelli Mora MD @ Feb 9 2020 10:07PM



Signed by Dr. Nelli Mora @ Feb 9 2020 10:12PM

## 2020-02-10 VITALS — HEART RATE: 98 BPM
